# Patient Record
Sex: FEMALE | Race: WHITE | Employment: UNEMPLOYED | URBAN - METROPOLITAN AREA
[De-identification: names, ages, dates, MRNs, and addresses within clinical notes are randomized per-mention and may not be internally consistent; named-entity substitution may affect disease eponyms.]

---

## 2023-02-23 ENCOUNTER — APPOINTMENT (OUTPATIENT)
Dept: GENERAL RADIOLOGY | Age: 38
End: 2023-02-23
Payer: COMMERCIAL

## 2023-02-23 ENCOUNTER — HOSPITAL ENCOUNTER (EMERGENCY)
Age: 38
Discharge: HOME | End: 2023-02-23
Attending: EMERGENCY MEDICINE
Payer: COMMERCIAL

## 2023-02-23 VITALS
TEMPERATURE: 98.3 F | HEIGHT: 66 IN | OXYGEN SATURATION: 100 % | SYSTOLIC BLOOD PRESSURE: 105 MMHG | HEART RATE: 61 BPM | RESPIRATION RATE: 13 BRPM | DIASTOLIC BLOOD PRESSURE: 67 MMHG

## 2023-02-23 DIAGNOSIS — E86.0 DEHYDRATION: Primary | ICD-10-CM

## 2023-02-23 LAB
ALBUMIN SERPL-MCNC: 4.3 G/DL (ref 3.5–5)
ALBUMIN/GLOB SERPL: 1.2 (ref 0.4–1.6)
ALP SERPL-CCNC: 69 U/L (ref 50–130)
ALT SERPL-CCNC: 24 U/L (ref 12–65)
ANION GAP SERPL CALC-SCNC: 4 MMOL/L (ref 2–11)
APPEARANCE UR: CLEAR
AST SERPL-CCNC: 11 U/L (ref 15–37)
BASOPHILS # BLD: 0 K/UL (ref 0–0.2)
BASOPHILS NFR BLD: 1 % (ref 0–2)
BILIRUB SERPL-MCNC: 0.4 MG/DL (ref 0.2–1.1)
BILIRUB UR QL: NEGATIVE
BUN SERPL-MCNC: 6 MG/DL (ref 6–23)
CALCIUM SERPL-MCNC: 9.4 MG/DL (ref 8.3–10.4)
CHLORIDE SERPL-SCNC: 105 MMOL/L (ref 101–110)
CO2 SERPL-SCNC: 30 MMOL/L (ref 21–32)
COLOR UR: ABNORMAL
CREAT SERPL-MCNC: 0.62 MG/DL (ref 0.6–1)
DIFFERENTIAL METHOD BLD: ABNORMAL
EOSINOPHIL # BLD: 0.1 K/UL (ref 0–0.8)
EOSINOPHIL NFR BLD: 2 % (ref 0.5–7.8)
ERYTHROCYTE [DISTWIDTH] IN BLOOD BY AUTOMATED COUNT: 13.3 % (ref 11.9–14.6)
GLOBULIN SER CALC-MCNC: 3.6 G/DL (ref 2.8–4.5)
GLUCOSE SERPL-MCNC: 99 MG/DL (ref 65–100)
GLUCOSE UR STRIP.AUTO-MCNC: NEGATIVE MG/DL
HCG UR QL: NEGATIVE
HCG, URINE, POC: NEGATIVE
HCT VFR BLD AUTO: 42.6 % (ref 35.8–46.3)
HGB BLD-MCNC: 13.4 G/DL (ref 11.7–15.4)
HGB UR QL STRIP: NEGATIVE
IMM GRANULOCYTES # BLD AUTO: 0 K/UL (ref 0–0.5)
IMM GRANULOCYTES NFR BLD AUTO: 0 % (ref 0–5)
KETONES UR QL STRIP.AUTO: ABNORMAL MG/DL
LEUKOCYTE ESTERASE UR QL STRIP.AUTO: NEGATIVE
LYMPHOCYTES # BLD: 1.2 K/UL (ref 0.5–4.6)
LYMPHOCYTES NFR BLD: 33 % (ref 13–44)
Lab: NORMAL
MCH RBC QN AUTO: 27.8 PG (ref 26.1–32.9)
MCHC RBC AUTO-ENTMCNC: 31.5 G/DL (ref 31.4–35)
MCV RBC AUTO: 88.4 FL (ref 82–102)
MONOCYTES # BLD: 0.5 K/UL (ref 0.1–1.3)
MONOCYTES NFR BLD: 14 % (ref 4–12)
NEGATIVE QC PASS/FAIL: NORMAL
NEUTS SEG # BLD: 1.8 K/UL (ref 1.7–8.2)
NEUTS SEG NFR BLD: 51 % (ref 43–78)
NITRITE UR QL STRIP.AUTO: NEGATIVE
NRBC # BLD: 0 K/UL (ref 0–0.2)
PH UR STRIP: 7.5 (ref 5–9)
PLATELET # BLD AUTO: 304 K/UL (ref 150–450)
PMV BLD AUTO: 10.3 FL (ref 9.4–12.3)
POSITIVE QC PASS/FAIL: NORMAL
POTASSIUM SERPL-SCNC: 3.5 MMOL/L (ref 3.5–5.1)
PROT SERPL-MCNC: 7.9 G/DL (ref 6.3–8.2)
PROT UR STRIP-MCNC: NEGATIVE MG/DL
RBC # BLD AUTO: 4.82 M/UL (ref 4.05–5.2)
SODIUM SERPL-SCNC: 139 MMOL/L (ref 133–143)
SP GR UR REFRACTOMETRY: 1.01 (ref 1–1.02)
UROBILINOGEN UR QL STRIP.AUTO: 0.2 EU/DL (ref 0.2–1)
WBC # BLD AUTO: 3.6 K/UL (ref 4.3–11.1)

## 2023-02-23 PROCEDURE — 99285 EMERGENCY DEPT VISIT HI MDM: CPT

## 2023-02-23 PROCEDURE — 81003 URINALYSIS AUTO W/O SCOPE: CPT

## 2023-02-23 PROCEDURE — 71045 X-RAY EXAM CHEST 1 VIEW: CPT

## 2023-02-23 PROCEDURE — 2580000003 HC RX 258: Performed by: EMERGENCY MEDICINE

## 2023-02-23 PROCEDURE — 80053 COMPREHEN METABOLIC PANEL: CPT

## 2023-02-23 PROCEDURE — 96360 HYDRATION IV INFUSION INIT: CPT

## 2023-02-23 PROCEDURE — 85025 COMPLETE CBC W/AUTO DIFF WBC: CPT

## 2023-02-23 PROCEDURE — 93005 ELECTROCARDIOGRAM TRACING: CPT | Performed by: EMERGENCY MEDICINE

## 2023-02-23 PROCEDURE — 87086 URINE CULTURE/COLONY COUNT: CPT

## 2023-02-23 PROCEDURE — 81025 URINE PREGNANCY TEST: CPT

## 2023-02-23 RX ORDER — DESVENLAFAXINE 100 MG/1
100 TABLET, EXTENDED RELEASE ORAL DAILY
COMMUNITY
Start: 2023-02-13 | End: 2023-04-14

## 2023-02-23 RX ORDER — 0.9 % SODIUM CHLORIDE 0.9 %
1000 INTRAVENOUS SOLUTION INTRAVENOUS ONCE
Status: COMPLETED | OUTPATIENT
Start: 2023-02-23 | End: 2023-02-23

## 2023-02-23 RX ORDER — CETIRIZINE HYDROCHLORIDE 10 MG/1
10 TABLET ORAL DAILY
COMMUNITY
Start: 2022-02-02

## 2023-02-23 RX ORDER — CABERGOLINE 0.5 MG/1
0.25 TABLET ORAL
COMMUNITY
Start: 2022-09-15

## 2023-02-23 RX ORDER — METOCLOPRAMIDE 5 MG/1
5 TABLET ORAL 3 TIMES DAILY PRN
COMMUNITY
Start: 2023-02-13 | End: 2023-04-14

## 2023-02-23 RX ORDER — METOPROLOL SUCCINATE 50 MG/1
50 TABLET, EXTENDED RELEASE ORAL
COMMUNITY

## 2023-02-23 RX ADMIN — SODIUM CHLORIDE 1000 ML: 9 INJECTION, SOLUTION INTRAVENOUS at 21:16

## 2023-02-23 ASSESSMENT — ENCOUNTER SYMPTOMS
DIARRHEA: 0
EYES NEGATIVE: 1
ALLERGIC/IMMUNOLOGIC NEGATIVE: 1
VOMITING: 0
GASTROINTESTINAL NEGATIVE: 1
SHORTNESS OF BREATH: 0
VISUAL CHANGE: 0
RESPIRATORY NEGATIVE: 1
NAUSEA: 0
BLOOD IN STOOL: 0

## 2023-02-23 ASSESSMENT — PAIN SCALES - GENERAL: PAINLEVEL_OUTOF10: 1

## 2023-02-23 ASSESSMENT — PAIN - FUNCTIONAL ASSESSMENT: PAIN_FUNCTIONAL_ASSESSMENT: 0-10

## 2023-02-23 ASSESSMENT — PAIN DESCRIPTION - LOCATION: LOCATION: CHEST

## 2023-02-23 ASSESSMENT — LIFESTYLE VARIABLES
HOW MANY STANDARD DRINKS CONTAINING ALCOHOL DO YOU HAVE ON A TYPICAL DAY: PATIENT DOES NOT DRINK
HOW OFTEN DO YOU HAVE A DRINK CONTAINING ALCOHOL: NEVER

## 2023-02-23 ASSESSMENT — PAIN DESCRIPTION - ORIENTATION: ORIENTATION: MID

## 2023-02-24 LAB
EKG ATRIAL RATE: 66 BPM
EKG DIAGNOSIS: NORMAL
EKG P AXIS: 37 DEGREES
EKG P-R INTERVAL: 132 MS
EKG Q-T INTERVAL: 420 MS
EKG QRS DURATION: 88 MS
EKG QTC CALCULATION (BAZETT): 440 MS
EKG R AXIS: 92 DEGREES
EKG T AXIS: 66 DEGREES
EKG VENTRICULAR RATE: 66 BPM

## 2023-02-24 NOTE — ED PROVIDER NOTES
Emergency Department Provider Note                   PCP:                Pcp No               Age: 40 y.o. Sex: female       ICD-10-CM    1. Dehydration  E86.0           DISPOSITION Decision To Discharge 02/23/2023 10:20:03 PM       Medical Decision Making  Patient feeling much better after the IV fluids here and states \"this is my normal POTS dehydration. \"  She does not desire any further work-up. Patient presented with acute dehydration to the ED. History obtained from patient. I reviewed the following tests labs, EKG, XR. I did review records from an external source. I did engage in shared decision making with the patient/family. I considered further evaluation but did not do it due to patient feeling much better after IVF and \"this is a normal flare of my POTS. \" There is no social determinant of health affecting care. I did not use a clinical guideline to determine care of the patient. I discussed results/disposition with patient. Problems Addressed:  Dehydration: acute illness or injury    Amount and/or Complexity of Data Reviewed  Labs: ordered. Radiology: ordered. ECG/medicine tests: ordered. Risk  Prescription drug management.                                 Orders Placed This Encounter   Procedures    Culture, Urine    XR CHEST PORTABLE    CBC with Auto Differential    Comprehensive Metabolic Panel    Urinalysis w rflx microscopic    Cardiac Monitor - ED Only    Continuous Pulse Oximetry    Orthostatic blood pressure and pulse    Orthostatic blood pressure and pulse    POCT Urine Dipstick    POC Pregnancy Urine Qual    POC Pregnancy Urine Qual    EKG 12 Lead        Medications   0.9 % sodium chloride bolus (0 mLs IntraVENous Stopped 2/23/23 2218)       Discharge Medication List as of 2/23/2023 10:28 PM           Joanna Sutherland is a 40 y.o. female who presents to the Emergency Department with chief complaint of    Chief Complaint   Patient presents with    Dizziness      Patient is here with lightheadedness and dizziness with postural changes that started this morning when she woke up. She has a history of POTS syndrome and states \"this is a normal flare for me. \"  She did go to work all day and her boss told her to come in and get IV fluids. She is currently receiving them now and feeling much better. No new chest pain, shortness of breath, upper abdominal pain, dizziness, weakness, dyspnea exertion, orthopnea, swelling/tingling or weakness to their arms or legs, trouble with urination or bowel movements or other new symptoms. They did ambulate to the room without difficulty. No trouble with speaking. The history is provided by the patient. Dizziness  Quality:  Lightheadedness  Severity:  Mild  Onset quality:  Gradual  Duration:  1 day  Timing:  Constant  Progression:  Unchanged  Chronicity:  Chronic  Context: bending over    Context: not with bowel movement, not with ear pain, not with eye movement, not with head movement, not with inactivity, not with loss of consciousness, not with medication, not with physical activity, not when standing up and not when urinating    Relieved by:  Nothing  Worsened by:  Nothing  Ineffective treatments:  None tried  Associated symptoms: no blood in stool, no chest pain, no diarrhea, no headaches, no hearing loss, no nausea, no palpitations, no shortness of breath, no syncope, no tinnitus, no vision changes, no vomiting and no weakness    Risk factors: no anemia, no heart disease, no hx of stroke, no hx of vertigo, no Meniere's disease, no multiple medications and no new medications       Review of Systems   Constitutional: Negative. Negative for fever. HENT: Negative. Negative for hearing loss and tinnitus. Eyes: Negative. Respiratory: Negative. Negative for shortness of breath. Cardiovascular: Negative. Negative for chest pain, palpitations and syncope. Gastrointestinal: Negative.   Negative for blood in stool, diarrhea, nausea and vomiting. Endocrine: Negative. Genitourinary: Negative. Musculoskeletal: Negative. Skin: Negative. Allergic/Immunologic: Negative. Neurological:  Positive for dizziness. Negative for weakness and headaches. Hematological: Negative. Psychiatric/Behavioral: Negative. All other systems reviewed and are negative. Past Medical History:   Diagnosis Date    Eating disorder     POTS (postural orthostatic tachycardia syndrome)         History reviewed. No pertinent surgical history. History reviewed. No pertinent family history. Social History     Socioeconomic History    Marital status: Single     Spouse name: None    Number of children: None    Years of education: None    Highest education level: None   Tobacco Use    Smoking status: Never   Substance and Sexual Activity    Alcohol use: Never        Allergies: Albuterol    Discharge Medication List as of 2/23/2023 10:28 PM        CONTINUE these medications which have NOT CHANGED    Details   desvenlafaxine succinate (PRISTIQ) 100 MG TB24 extended release tablet Take 100 mg by mouth dailyHistorical Med      cabergoline (DOSTINEX) 0.5 MG tablet Take 0.25 mg by mouth Twice a WeekHistorical Med      metoclopramide (REGLAN) 5 MG tablet Take 5 mg by mouth 3 times daily as neededHistorical Med      cetirizine (ZYRTEC) 10 MG tablet Take 10 mg by mouth dailyHistorical Med      metoprolol succinate (TOPROL XL) 50 MG extended release tablet Take 50 mg by mouthHistorical Med              Vitals signs and nursing note reviewed. Patient Vitals for the past 4 hrs:   Pulse Resp BP SpO2   02/23/23 2200 61 13 105/67 100 %   02/23/23 2130 67 18 109/70 100 %   02/23/23 2112 81 16 124/79 100 %   02/23/23 2111 79 16 122/65 99 %   02/23/23 2110 86 20 124/79 100 %   02/23/23 2109 79 12 123/66 100 %   02/23/23 2108 80 21 122/65 99 %          Physical Exam  Vitals and nursing note reviewed. Constitutional:       Appearance: Normal appearance.    HENT: Head: Normocephalic and atraumatic. Right Ear: External ear normal.      Left Ear: External ear normal.      Nose: Nose normal.      Mouth/Throat:      Mouth: Mucous membranes are dry. Pharynx: Oropharynx is clear. Eyes:      Extraocular Movements: Extraocular movements intact. Conjunctiva/sclera: Conjunctivae normal.      Pupils: Pupils are equal, round, and reactive to light. Cardiovascular:      Rate and Rhythm: Normal rate and regular rhythm. Pulses: Normal pulses. Heart sounds: Normal heart sounds. Pulmonary:      Effort: Pulmonary effort is normal.      Breath sounds: Normal breath sounds. Abdominal:      General: Abdomen is flat. Palpations: Abdomen is soft. Musculoskeletal:         General: Normal range of motion. Cervical back: Normal range of motion and neck supple. Skin:     General: Skin is warm. Capillary Refill: Capillary refill takes less than 2 seconds. Neurological:      General: No focal deficit present. Mental Status: She is alert and oriented to person, place, and time. Mental status is at baseline. GCS: GCS eye subscore is 4. GCS verbal subscore is 5. GCS motor subscore is 6. Cranial Nerves: Cranial nerves 2-12 are intact. Sensory: Sensation is intact. Motor: Motor function is intact. Coordination: Coordination is intact. Gait: Gait is intact. Psychiatric:         Mood and Affect: Mood normal.         Behavior: Behavior normal.         Thought Content: Thought content normal.         Judgment: Judgment normal.        Procedures    ED EKG Interpretation  EKG was interpreted in the absence of a cardiologist.    Rate: 66 bpm.  EKG Interpretation: EKG Interpretation: sinus rhythm  ST Segments: Normal ST segments - NO STEMI    Is this patient to be included in the SEP-1 core measure due to severe sepsis or septic shock?  No Exclusion criteria - the patient is NOT to be included for SEP-1 Core Measure due to: Infection is not suspected     Results for orders placed or performed during the hospital encounter of 02/23/23   XR CHEST PORTABLE    Narrative    EXAMINATION: Single view chest    DATE: 2/23/2023 8:03 PM    COMPARISON: None    CLINICAL HISTORY: Syncope    FINDINGS:    Costophrenic angles are clear. Heart size is normal.  Pulmonary vasculature is not distended. There are no dense regions of   consolidation. Impression    No acute cardiopulmonary process. Walter Holland M.D.   2/23/2023 8:27:00 PM   CBC with Auto Differential   Result Value Ref Range    WBC 3.6 (L) 4.3 - 11.1 K/uL    RBC 4.82 4.05 - 5.2 M/uL    Hemoglobin 13.4 11.7 - 15.4 g/dL    Hematocrit 42.6 35.8 - 46.3 %    MCV 88.4 82.0 - 102.0 FL    MCH 27.8 26.1 - 32.9 PG    MCHC 31.5 31.4 - 35.0 g/dL    RDW 13.3 11.9 - 14.6 %    Platelets 672 464 - 307 K/uL    MPV 10.3 9.4 - 12.3 FL    nRBC 0.00 0.0 - 0.2 K/uL    Differential Type AUTOMATED      Seg Neutrophils 51 43 - 78 %    Lymphocytes 33 13 - 44 %    Monocytes 14 (H) 4.0 - 12.0 %    Eosinophils % 2 0.5 - 7.8 %    Basophils 1 0.0 - 2.0 %    Immature Granulocytes 0 0.0 - 5.0 %    Segs Absolute 1.8 1.7 - 8.2 K/UL    Absolute Lymph # 1.2 0.5 - 4.6 K/UL    Absolute Mono # 0.5 0.1 - 1.3 K/UL    Absolute Eos # 0.1 0.0 - 0.8 K/UL    Basophils Absolute 0.0 0.0 - 0.2 K/UL    Absolute Immature Granulocyte 0.0 0.0 - 0.5 K/UL   Comprehensive Metabolic Panel   Result Value Ref Range    Sodium 139 133 - 143 mmol/L    Potassium 3.5 3.5 - 5.1 mmol/L    Chloride 105 101 - 110 mmol/L    CO2 30 21 - 32 mmol/L    Anion Gap 4 2 - 11 mmol/L    Glucose 99 65 - 100 mg/dL    BUN 6 6 - 23 MG/DL    Creatinine 0.62 0.6 - 1.0 MG/DL    Est, Glom Filt Rate >60 >60 ml/min/1.73m2    Calcium 9.4 8.3 - 10.4 MG/DL    Total Bilirubin 0.4 0.2 - 1.1 MG/DL    ALT 24 12 - 65 U/L    AST 11 (L) 15 - 37 U/L    Alk Phosphatase 69 50 - 130 U/L    Total Protein 7.9 6.3 - 8.2 g/dL    Albumin 4.3 3.5 - 5.0 g/dL    Globulin 3.6 2.8 - 4.5 g/dL    Albumin/Globulin Ratio 1.2 0.4 - 1.6     Urinalysis w rflx microscopic   Result Value Ref Range    Color, UA YELLOW/STRAW      Appearance CLEAR      Specific Gravity, UA 1.007 1.001 - 1.023      pH, Urine 7.5 5.0 - 9.0      Protein, UA Negative NEG mg/dL    Glucose, UA Negative mg/dL    Ketones, Urine TRACE (A) NEG mg/dL    Bilirubin Urine Negative NEG      Blood, Urine Negative NEG      Urobilinogen, Urine 0.2 0.2 - 1.0 EU/dL    Nitrite, Urine Negative NEG      Leukocyte Esterase, Urine Negative NEG     POC Pregnancy Urine Qual   Result Value Ref Range    HCG, Urine, POC Negative Negative    Lot Number [id number]     Positive QC Pass/Fail Pass     Negative QC Pass/Fail Pass    POC Pregnancy Urine Qual   Result Value Ref Range    Preg Test, Ur Negative NEG     EKG 12 Lead   Result Value Ref Range    Ventricular Rate 66 BPM    Atrial Rate 66 BPM    P-R Interval 132 ms    QRS Duration 88 ms    Q-T Interval 420 ms    QTc Calculation (Bazett) 440 ms    P Axis 37 degrees    R Axis 92 degrees    T Axis 66 degrees    Diagnosis Normal sinus rhythm         XR CHEST PORTABLE   Final Result      No acute cardiopulmonary process. Walter Lester M.D.    2/23/2023 8:27:00 PM                            Voice dictation software was used during the making of this note. This software is not perfect and grammatical and other typographical errors may be present. This note has not been completely proofread for errors.      ALEXANDRA Romero  02/24/23 [id number]

## 2023-02-24 NOTE — DISCHARGE INSTRUCTIONS
Drink plenty of fluids, rest, follow-up with your primary care physician for recheck and return to the ED if worsening in any way.

## 2023-02-25 LAB
BACTERIA SPEC CULT: ABNORMAL
SERVICE CMNT-IMP: ABNORMAL

## 2023-03-24 ENCOUNTER — HOSPITAL ENCOUNTER (EMERGENCY)
Age: 38
Discharge: HOME | End: 2023-03-24
Attending: EMERGENCY MEDICINE
Payer: COMMERCIAL

## 2023-03-24 VITALS
OXYGEN SATURATION: 99 % | TEMPERATURE: 98.3 F | DIASTOLIC BLOOD PRESSURE: 66 MMHG | HEIGHT: 66 IN | SYSTOLIC BLOOD PRESSURE: 106 MMHG | HEART RATE: 62 BPM | RESPIRATION RATE: 14 BRPM

## 2023-03-24 DIAGNOSIS — R42 LIGHTHEADEDNESS: Primary | ICD-10-CM

## 2023-03-24 LAB
ALBUMIN SERPL-MCNC: 3.9 G/DL (ref 3.5–5)
ALBUMIN/GLOB SERPL: 1.1 (ref 0.4–1.6)
ALP SERPL-CCNC: 55 U/L (ref 50–136)
ALT SERPL-CCNC: 39 U/L (ref 12–65)
ANION GAP SERPL CALC-SCNC: 3 MMOL/L (ref 2–11)
AST SERPL-CCNC: 14 U/L (ref 15–37)
BASOPHILS # BLD: 0 K/UL (ref 0–0.2)
BASOPHILS NFR BLD: 1 % (ref 0–2)
BILIRUB SERPL-MCNC: 0.2 MG/DL (ref 0.2–1.1)
BUN SERPL-MCNC: 8 MG/DL (ref 6–23)
CALCIUM SERPL-MCNC: 9.9 MG/DL (ref 8.3–10.4)
CHLORIDE SERPL-SCNC: 105 MMOL/L (ref 101–110)
CO2 SERPL-SCNC: 31 MMOL/L (ref 21–32)
CREAT SERPL-MCNC: 0.62 MG/DL (ref 0.6–1)
DIFFERENTIAL METHOD BLD: ABNORMAL
EOSINOPHIL # BLD: 0.1 K/UL (ref 0–0.8)
EOSINOPHIL NFR BLD: 4 % (ref 0.5–7.8)
ERYTHROCYTE [DISTWIDTH] IN BLOOD BY AUTOMATED COUNT: 13.8 % (ref 11.9–14.6)
GLOBULIN SER CALC-MCNC: 3.5 G/DL (ref 2.8–4.5)
GLUCOSE SERPL-MCNC: 111 MG/DL (ref 65–100)
HCT VFR BLD AUTO: 41.1 % (ref 35.8–46.3)
HGB BLD-MCNC: 12.9 G/DL (ref 11.7–15.4)
IMM GRANULOCYTES # BLD AUTO: 0 K/UL (ref 0–0.5)
IMM GRANULOCYTES NFR BLD AUTO: 1 % (ref 0–5)
LYMPHOCYTES # BLD: 1.5 K/UL (ref 0.5–4.6)
LYMPHOCYTES NFR BLD: 37 % (ref 13–44)
MCH RBC QN AUTO: 27.6 PG (ref 26.1–32.9)
MCHC RBC AUTO-ENTMCNC: 31.4 G/DL (ref 31.4–35)
MCV RBC AUTO: 88 FL (ref 82–102)
MONOCYTES # BLD: 0.5 K/UL (ref 0.1–1.3)
MONOCYTES NFR BLD: 11 % (ref 4–12)
NEUTS SEG # BLD: 1.9 K/UL (ref 1.7–8.2)
NEUTS SEG NFR BLD: 47 % (ref 43–78)
NRBC # BLD: 0 K/UL (ref 0–0.2)
PLATELET # BLD AUTO: 280 K/UL (ref 150–450)
PMV BLD AUTO: 11 FL (ref 9.4–12.3)
POTASSIUM SERPL-SCNC: 3.8 MMOL/L (ref 3.5–5.1)
PROT SERPL-MCNC: 7.4 G/DL (ref 6.3–8.2)
RBC # BLD AUTO: 4.67 M/UL (ref 4.05–5.2)
SODIUM SERPL-SCNC: 139 MMOL/L (ref 133–143)
TSH W FREE THYROID IF ABNORMAL: 1.75 UIU/ML (ref 0.36–3.74)
WBC # BLD AUTO: 4 K/UL (ref 4.3–11.1)

## 2023-03-24 PROCEDURE — 93005 ELECTROCARDIOGRAM TRACING: CPT

## 2023-03-24 PROCEDURE — 85025 COMPLETE CBC W/AUTO DIFF WBC: CPT

## 2023-03-24 PROCEDURE — 99284 EMERGENCY DEPT VISIT MOD MDM: CPT

## 2023-03-24 PROCEDURE — 80053 COMPREHEN METABOLIC PANEL: CPT

## 2023-03-24 PROCEDURE — 2580000003 HC RX 258

## 2023-03-24 PROCEDURE — 84443 ASSAY THYROID STIM HORMONE: CPT

## 2023-03-24 RX ORDER — SODIUM CHLORIDE, SODIUM LACTATE, POTASSIUM CHLORIDE, AND CALCIUM CHLORIDE .6; .31; .03; .02 G/100ML; G/100ML; G/100ML; G/100ML
1000 INJECTION, SOLUTION INTRAVENOUS ONCE
Status: COMPLETED | OUTPATIENT
Start: 2023-03-24 | End: 2023-03-24

## 2023-03-24 RX ADMIN — SODIUM CHLORIDE, POTASSIUM CHLORIDE, SODIUM LACTATE AND CALCIUM CHLORIDE 1000 ML: 600; 310; 30; 20 INJECTION, SOLUTION INTRAVENOUS at 21:43

## 2023-03-24 ASSESSMENT — ENCOUNTER SYMPTOMS
DIARRHEA: 0
SHORTNESS OF BREATH: 0
VOMITING: 0
CHEST TIGHTNESS: 0
COLOR CHANGE: 0
ABDOMINAL PAIN: 0
NAUSEA: 0

## 2023-03-25 LAB
EKG ATRIAL RATE: 74 BPM
EKG DIAGNOSIS: NORMAL
EKG P AXIS: 60 DEGREES
EKG P-R INTERVAL: 146 MS
EKG Q-T INTERVAL: 392 MS
EKG QRS DURATION: 92 MS
EKG QTC CALCULATION (BAZETT): 432 MS
EKG R AXIS: 94 DEGREES
EKG T AXIS: 67 DEGREES
EKG VENTRICULAR RATE: 73 BPM

## 2023-03-25 NOTE — ED NOTES
I have reviewed discharge instructions with the patient. The patient verbalized understanding. Patient left ED via Discharge Method: ambulatory to Home with self. Opportunity for questions and clarification provided. Patient given 0 scripts. To continue your aftercare when you leave the hospital, you may receive an automated call from our care team to check in on how you are doing. This is a free service and part of our promise to provide the best care and service to meet your aftercare needs.  If you have questions, or wish to unsubscribe from this service please call [phone]. Thank you for Choosing our [facility] Emergency Department.         Ananth Valencia RN  03/24/23 4445

## 2023-03-25 NOTE — ED PROVIDER NOTES
Exclusion criteria - the patient is NOT to be included for SEP-1 Core Measure due to: Infection is not suspected     Chano Contreras is a 40 y.o. female who presents to the Emergency Department with chief complaint of    Chief Complaint   Patient presents with    Dizziness    Fatigue      Patient is a 70-year-old female with history of postural orthostatic hypotension presenting to the emergency department for evaluation of lightheadedness and dizziness for the past several days. States that she had a bout of nausea, vomiting, and diarrhea approximately 1 week prior and has felt some residual symptoms of lightheadedness especially when changing positions. States she has been trying to rehydrate at home but has not quite gotten over her illness. States that she does have to periodically present for IV fluids in the setting of her POTS. She endorses occasional palpitations but denies other symptomatology. She denies any additional aggravating relieving factors or radiation of her symptoms. She has no other complaints today. The history is provided by the patient. Review of Systems   Constitutional:  Negative for chills, fatigue and fever. Eyes:  Negative for visual disturbance. Respiratory:  Negative for chest tightness and shortness of breath. Cardiovascular:  Negative for chest pain and palpitations. Gastrointestinal:  Negative for abdominal pain, diarrhea, nausea and vomiting. Genitourinary:  Negative for dysuria. Musculoskeletal:  Negative for arthralgias and myalgias. Skin:  Negative for color change and wound. Neurological:  Positive for dizziness and light-headedness. Negative for syncope, weakness and headaches. All other systems reviewed and are negative. Vitals signs and nursing note reviewed:  No data found. Physical Exam  Vitals and nursing note reviewed. Constitutional:       General: She is not in acute distress. Appearance: Normal appearance.  She is not

## 2023-03-25 NOTE — ED TRIAGE NOTES
Dizziness x 1wk since stomach flu last week; lightheaded and palpitations with standing, hx of POTS and anorexia, episodes of purging 2wks ago

## 2023-03-25 NOTE — DISCHARGE INSTRUCTIONS
Lab work very reassuring. EKG looks good. You did receive some fluids in the emergency department which appeared to improve your symptoms. Please return any worsening symptoms or concerns.   Otherwise, plan to follow-up with your primary care doctor and cardiologist.

## 2023-04-10 DIAGNOSIS — G90.A POTS (POSTURAL ORTHOSTATIC TACHYCARDIA SYNDROME): ICD-10-CM

## 2023-04-10 LAB — HGB BLD-MCNC: 13.6 G/DL (ref 11.7–15.4)

## 2023-04-11 LAB
FERRITIN SERPL-MCNC: 15 NG/ML (ref 8–388)
IRON SATN MFR SERPL: 31 %
IRON SERPL-MCNC: 114 UG/DL (ref 35–150)
TIBC SERPL-MCNC: 369 UG/DL (ref 250–450)

## 2023-04-13 ENCOUNTER — TELEPHONE (OUTPATIENT)
Dept: CARDIOLOGY CLINIC | Age: 38
End: 2023-04-13

## 2023-05-23 ENCOUNTER — HOSPITAL ENCOUNTER (EMERGENCY)
Age: 38
Discharge: HOME | End: 2023-05-23
Attending: EMERGENCY MEDICINE
Payer: COMMERCIAL

## 2023-05-23 ENCOUNTER — OFFICE VISIT (OUTPATIENT)
Age: 38
End: 2023-05-23
Payer: COMMERCIAL

## 2023-05-23 VITALS
WEIGHT: 150 LBS | RESPIRATION RATE: 17 BRPM | BODY MASS INDEX: 24.11 KG/M2 | HEIGHT: 66 IN | TEMPERATURE: 97.7 F | DIASTOLIC BLOOD PRESSURE: 78 MMHG | SYSTOLIC BLOOD PRESSURE: 94 MMHG | HEART RATE: 68 BPM | OXYGEN SATURATION: 99 %

## 2023-05-23 VITALS
HEART RATE: 80 BPM | HEIGHT: 66 IN | SYSTOLIC BLOOD PRESSURE: 132 MMHG | BODY MASS INDEX: 24.25 KG/M2 | DIASTOLIC BLOOD PRESSURE: 64 MMHG | WEIGHT: 150.9 LBS

## 2023-05-23 DIAGNOSIS — R42 LIGHTHEADEDNESS: Primary | ICD-10-CM

## 2023-05-23 DIAGNOSIS — G90.A POTS (POSTURAL ORTHOSTATIC TACHYCARDIA SYNDROME): Primary | ICD-10-CM

## 2023-05-23 LAB
ALBUMIN SERPL-MCNC: 4.1 G/DL (ref 3.5–5)
ALBUMIN/GLOB SERPL: 1.1 (ref 0.4–1.6)
ALP SERPL-CCNC: 55 U/L (ref 50–136)
ALT SERPL-CCNC: 23 U/L (ref 12–65)
ANION GAP SERPL CALC-SCNC: 3 MMOL/L (ref 2–11)
AST SERPL-CCNC: 13 U/L (ref 15–37)
BASOPHILS # BLD: 0 K/UL (ref 0–0.2)
BASOPHILS NFR BLD: 1 % (ref 0–2)
BILIRUB SERPL-MCNC: 0.3 MG/DL (ref 0.2–1.1)
BUN SERPL-MCNC: 9 MG/DL (ref 6–23)
CALCIUM SERPL-MCNC: 9.4 MG/DL (ref 8.3–10.4)
CHLORIDE SERPL-SCNC: 110 MMOL/L (ref 101–110)
CO2 SERPL-SCNC: 28 MMOL/L (ref 21–32)
CREAT SERPL-MCNC: 0.66 MG/DL (ref 0.6–1)
DIFFERENTIAL METHOD BLD: ABNORMAL
EOSINOPHIL # BLD: 0.1 K/UL (ref 0–0.8)
EOSINOPHIL NFR BLD: 2 % (ref 0.5–7.8)
ERYTHROCYTE [DISTWIDTH] IN BLOOD BY AUTOMATED COUNT: 13 % (ref 11.9–14.6)
GLOBULIN SER CALC-MCNC: 3.9 G/DL (ref 2.8–4.5)
GLUCOSE SERPL-MCNC: 111 MG/DL (ref 65–100)
HCT VFR BLD AUTO: 43.2 % (ref 35.8–46.3)
HGB BLD-MCNC: 13.9 G/DL (ref 11.7–15.4)
IMM GRANULOCYTES # BLD AUTO: 0 K/UL (ref 0–0.5)
IMM GRANULOCYTES NFR BLD AUTO: 0 % (ref 0–5)
LYMPHOCYTES # BLD: 1.1 K/UL (ref 0.5–4.6)
LYMPHOCYTES NFR BLD: 33 % (ref 13–44)
MAGNESIUM SERPL-MCNC: 2.5 MG/DL (ref 1.8–2.4)
MCH RBC QN AUTO: 28.7 PG (ref 26.1–32.9)
MCHC RBC AUTO-ENTMCNC: 32.2 G/DL (ref 31.4–35)
MCV RBC AUTO: 89.3 FL (ref 82–102)
MONOCYTES # BLD: 0.4 K/UL (ref 0.1–1.3)
MONOCYTES NFR BLD: 12 % (ref 4–12)
NEUTS SEG # BLD: 1.8 K/UL (ref 1.7–8.2)
NEUTS SEG NFR BLD: 52 % (ref 43–78)
NRBC # BLD: 0 K/UL (ref 0–0.2)
PLATELET # BLD AUTO: 267 K/UL (ref 150–450)
PMV BLD AUTO: 10.7 FL (ref 9.4–12.3)
POTASSIUM SERPL-SCNC: 3.6 MMOL/L (ref 3.5–5.1)
PROT SERPL-MCNC: 8 G/DL (ref 6.3–8.2)
RBC # BLD AUTO: 4.84 M/UL (ref 4.05–5.2)
SODIUM SERPL-SCNC: 141 MMOL/L (ref 133–143)
WBC # BLD AUTO: 3.5 K/UL (ref 4.3–11.1)

## 2023-05-23 PROCEDURE — 83735 ASSAY OF MAGNESIUM: CPT

## 2023-05-23 PROCEDURE — 99284 EMERGENCY DEPT VISIT MOD MDM: CPT

## 2023-05-23 PROCEDURE — 2580000003 HC RX 258: Performed by: PHYSICIAN ASSISTANT

## 2023-05-23 PROCEDURE — 85025 COMPLETE CBC W/AUTO DIFF WBC: CPT

## 2023-05-23 PROCEDURE — 80053 COMPREHEN METABOLIC PANEL: CPT

## 2023-05-23 PROCEDURE — 99214 OFFICE O/P EST MOD 30 MIN: CPT | Performed by: INTERNAL MEDICINE

## 2023-05-23 PROCEDURE — 93005 ELECTROCARDIOGRAM TRACING: CPT | Performed by: PHYSICIAN ASSISTANT

## 2023-05-23 PROCEDURE — 96361 HYDRATE IV INFUSION ADD-ON: CPT

## 2023-05-23 PROCEDURE — 96360 HYDRATION IV INFUSION INIT: CPT

## 2023-05-23 RX ORDER — 0.9 % SODIUM CHLORIDE 0.9 %
1000 INTRAVENOUS SOLUTION INTRAVENOUS ONCE
Status: COMPLETED | OUTPATIENT
Start: 2023-05-23 | End: 2023-05-23

## 2023-05-23 RX ORDER — NORGESTIMATE AND ETHINYL ESTRADIOL 0.25-0.035
1 KIT ORAL DAILY
COMMUNITY
Start: 2023-05-15

## 2023-05-23 RX ADMIN — SODIUM CHLORIDE 1000 ML: 9 INJECTION, SOLUTION INTRAVENOUS at 18:09

## 2023-05-23 ASSESSMENT — PAIN - FUNCTIONAL ASSESSMENT: PAIN_FUNCTIONAL_ASSESSMENT: 0-10

## 2023-05-23 ASSESSMENT — ENCOUNTER SYMPTOMS
STRIDOR: 0
NAIL CHANGES: 0
ABDOMINAL PAIN: 0
EYE PAIN: 0
APHONIA: 0
GASTROINTESTINAL NEGATIVE: 1
COUGH: 0
RESPIRATORY NEGATIVE: 1

## 2023-05-23 ASSESSMENT — PAIN SCALES - GENERAL: PAINLEVEL_OUTOF10: 0

## 2023-05-23 NOTE — ED PROVIDER NOTES
Emergency Department Provider Note       PCP: Mickey Hawthorne RN   Age: 40 y.o. Sex: female     DISPOSITION Decision To Discharge 05/23/2023 07:18:08 PM       ICD-10-CM    1. Lightheadedness  R42           Medical Decision Making     Complexity of Problems Addressed:  1 or more acute illnesses that pose a threat to life or bodily function. Data Reviewed and Analyzed:  Category 1:   I independently ordered and reviewed each unique test.  I reviewed external records: provider visit note from outside specialist.       Category 2:       Category 3: Discussion of management or test interpretation. Pt is a 44yo female with hx pots who comes in with orthostatic light headedness. Her pcp told her to come here for IVF, as she typically has these symptoms with volume depletion. She also has hx anorexia and has not been eating as well. Labs grossly unrevealing without significant abnormalities. Ekg non ischemic. Not orthostatic urine without infection. Not pregnant. Feels better after IVF. Standing without symptoms. Will dc home. Strict return precautions given. She is agreeable to plan. Risk of Complications and/or Morbidity of Patient Management:  Chronic medical problems impacting care include POTS, anorexia. ED Course as of 05/23/23 1928 Tue May 23, 2023   1915 EKG 12 Lead  EKG: NSR rate of 70 no acute ischemic changes. No stemi. Qtc 449 [ZAIN]      ED Course User Index  [ZAIN] ALEXANDRA Powell       Eze Camarena is a 40 y.o. female who presents to the Emergency Department with chief complaint of    Chief Complaint   Patient presents with    Dizziness      Patient is a 70-year-old female with history of POTS and anorexia. She states that she has been having some orthostatic dizziness which is typical when she is dehydrated. She saw her cardiologist today who reportedly agrees with her primary care doctor who reportedly told her to come here for IV fluids.   She says typically when she gets

## 2023-05-23 NOTE — PROGRESS NOTES
History:  Past Medical History:   Diagnosis Date    Abnormal Pap smear of cervix 2007   followed up with pap smears per pt report    Anxiety 2010    Asthma    Depression    Female infertility present    Pachyonychia congenita    SVT (supraventricular tachycardia) ([facility] [address]    Varicella 1991     Past Surgical History:   Procedure Laterality Date    DILATION AND CURETTAGE OF UTERUS   with polypectomy    HYSTEROSCOPY 2022    UPPER GASTROINTESTINAL ENDOSCOPY   age 21    WISDOM TOOTH EXTRACTION   age 12     Family History   Problem Relation Age of Onset    Alcohol abuse Maternal Uncle    Anxiety disorder Mother    Depression Mother    Asthma Mother    Stroke Mother    Depression Maternal Grandmother     Past medical/surgical history, medications, family and social history were reviewed and documented in medical record. Allergies   Allergen Reactions    Apple Headache-Intolerance    Augmentin [Amoxicillin-Pot Clavulanate] Nausea and/or vomiting-Intolerance    Meperidine Rash     Social History     Tobacco Use    Smoking status: Never    Smokeless tobacco: Never   Substance Use Topics    Alcohol use: Never       ROS:    Review of Systems   Constitutional: Negative for fever. HENT:  Negative for stridor. Eyes:  Negative for pain. Cardiovascular:  Negative for chest pain. Respiratory:  Negative for cough. Endocrine: Negative for cold intolerance. Skin:  Negative for nail changes. Musculoskeletal:  Negative for arthritis. Gastrointestinal:  Negative for abdominal pain. Genitourinary:  Negative for dysuria. Neurological:  Negative for aphonia. Psychiatric/Behavioral:  Negative for altered mental status. Allergic/Immunologic: Negative for hives. PHYSICAL EXAM:   /64   Pulse 80   Ht 5' 6\" (1.676 m)   Wt 150 lb 14.4 oz (68.4 kg)   BMI 24.36 kg/m²      Physical Exam  Vitals reviewed. HENT:      Head: Normocephalic.       Right Ear: External ear normal.      Left Ear: External ear normal.

## 2023-05-23 NOTE — ED TRIAGE NOTES
Dizziness that started on Thursday on Thursday and has progressively gotten worse. Reports associated nausea. Saw PCP today who recommended her to get fluids. Denies numbness/tingling/vision changes. Denies injury/trauma .  Hx POTS

## 2023-05-24 LAB
EKG ATRIAL RATE: 70 BPM
EKG DIAGNOSIS: NORMAL
EKG P AXIS: 35 DEGREES
EKG P-R INTERVAL: 138 MS
EKG Q-T INTERVAL: 416 MS
EKG QRS DURATION: 80 MS
EKG QTC CALCULATION (BAZETT): 449 MS
EKG R AXIS: 95 DEGREES
EKG T AXIS: 76 DEGREES
EKG VENTRICULAR RATE: 70 BPM

## 2023-05-24 PROCEDURE — 93010 ELECTROCARDIOGRAM REPORT: CPT | Performed by: INTERNAL MEDICINE

## 2023-05-24 NOTE — ED NOTES
I have reviewed discharge instructions with the patient. The patient verbalized understanding. Patient left ED via Discharge Method: ambulatory to Home with self. Opportunity for questions and clarification provided. Patient given 0 scripts. To continue your aftercare when you leave the hospital, you may receive an automated call from our care team to check in on how you are doing. This is a free service and part of our promise to provide the best care and service to meet your aftercare needs.  If you have questions, or wish to unsubscribe from this service please call [phone]. Thank you for Choosing our [facility] Emergency Department.         Carmine Dick RN  05/23/23 0555

## 2023-06-09 ENCOUNTER — APPOINTMENT (OUTPATIENT)
Dept: GENERAL RADIOLOGY | Age: 38
End: 2023-06-09
Payer: COMMERCIAL

## 2023-06-09 ENCOUNTER — HOSPITAL ENCOUNTER (EMERGENCY)
Age: 38
Discharge: HOME | End: 2023-06-09
Attending: EMERGENCY MEDICINE
Payer: COMMERCIAL

## 2023-06-09 VITALS
RESPIRATION RATE: 18 BRPM | SYSTOLIC BLOOD PRESSURE: 120 MMHG | BODY MASS INDEX: 24.21 KG/M2 | HEIGHT: 66 IN | TEMPERATURE: 97.7 F | HEART RATE: 68 BPM | OXYGEN SATURATION: 98 % | DIASTOLIC BLOOD PRESSURE: 65 MMHG

## 2023-06-09 DIAGNOSIS — I95.1 ORTHOSTATIC SYNCOPE: Primary | ICD-10-CM

## 2023-06-09 LAB
ALBUMIN SERPL-MCNC: 4 G/DL (ref 3.5–5)
ALBUMIN/GLOB SERPL: 1 (ref 0.4–1.6)
ALP SERPL-CCNC: 48 U/L (ref 50–136)
ALT SERPL-CCNC: 21 U/L (ref 12–65)
ANION GAP SERPL CALC-SCNC: 7 MMOL/L (ref 2–11)
AST SERPL-CCNC: 12 U/L (ref 15–37)
BASOPHILS # BLD: 0 K/UL (ref 0–0.2)
BASOPHILS NFR BLD: 1 % (ref 0–2)
BILIRUB SERPL-MCNC: 0.3 MG/DL (ref 0.2–1.1)
BUN SERPL-MCNC: 11 MG/DL (ref 6–23)
CALCIUM SERPL-MCNC: 9.8 MG/DL (ref 8.3–10.4)
CHLORIDE SERPL-SCNC: 107 MMOL/L (ref 101–110)
CO2 SERPL-SCNC: 28 MMOL/L (ref 21–32)
CREAT SERPL-MCNC: 0.68 MG/DL (ref 0.6–1)
DIFFERENTIAL METHOD BLD: ABNORMAL
EKG ATRIAL RATE: 60 BPM
EKG DIAGNOSIS: NORMAL
EKG P AXIS: 40 DEGREES
EKG P-R INTERVAL: 128 MS
EKG Q-T INTERVAL: 434 MS
EKG QRS DURATION: 82 MS
EKG QTC CALCULATION (BAZETT): 434 MS
EKG R AXIS: 91 DEGREES
EKG T AXIS: 71 DEGREES
EKG VENTRICULAR RATE: 60 BPM
EOSINOPHIL # BLD: 0.1 K/UL (ref 0–0.8)
EOSINOPHIL NFR BLD: 2 % (ref 0.5–7.8)
ERYTHROCYTE [DISTWIDTH] IN BLOOD BY AUTOMATED COUNT: 13.4 % (ref 11.9–14.6)
GLOBULIN SER CALC-MCNC: 4.2 G/DL (ref 2.8–4.5)
GLUCOSE SERPL-MCNC: 99 MG/DL (ref 65–100)
HCT VFR BLD AUTO: 43.4 % (ref 35.8–46.3)
HGB BLD-MCNC: 14 G/DL (ref 11.7–15.4)
IMM GRANULOCYTES # BLD AUTO: 0 K/UL (ref 0–0.5)
IMM GRANULOCYTES NFR BLD AUTO: 0 % (ref 0–5)
LYMPHOCYTES # BLD: 1.3 K/UL (ref 0.5–4.6)
LYMPHOCYTES NFR BLD: 33 % (ref 13–44)
MCH RBC QN AUTO: 28.7 PG (ref 26.1–32.9)
MCHC RBC AUTO-ENTMCNC: 32.3 G/DL (ref 31.4–35)
MCV RBC AUTO: 88.9 FL (ref 82–102)
MONOCYTES # BLD: 0.6 K/UL (ref 0.1–1.3)
MONOCYTES NFR BLD: 14 % (ref 4–12)
NEUTS SEG # BLD: 2 K/UL (ref 1.7–8.2)
NEUTS SEG NFR BLD: 51 % (ref 43–78)
NRBC # BLD: 0 K/UL (ref 0–0.2)
PLATELET # BLD AUTO: 236 K/UL (ref 150–450)
PMV BLD AUTO: 11.3 FL (ref 9.4–12.3)
POTASSIUM SERPL-SCNC: 3.8 MMOL/L (ref 3.5–5.1)
PROT SERPL-MCNC: 8.2 G/DL (ref 6.3–8.2)
RBC # BLD AUTO: 4.88 M/UL (ref 4.05–5.2)
SODIUM SERPL-SCNC: 142 MMOL/L (ref 133–143)
WBC # BLD AUTO: 4 K/UL (ref 4.3–11.1)

## 2023-06-09 PROCEDURE — 93010 ELECTROCARDIOGRAM REPORT: CPT | Performed by: INTERNAL MEDICINE

## 2023-06-09 PROCEDURE — 71045 X-RAY EXAM CHEST 1 VIEW: CPT

## 2023-06-09 PROCEDURE — 80053 COMPREHEN METABOLIC PANEL: CPT

## 2023-06-09 PROCEDURE — 93005 ELECTROCARDIOGRAM TRACING: CPT | Performed by: EMERGENCY MEDICINE

## 2023-06-09 PROCEDURE — 2580000003 HC RX 258: Performed by: EMERGENCY MEDICINE

## 2023-06-09 PROCEDURE — 85025 COMPLETE CBC W/AUTO DIFF WBC: CPT

## 2023-06-09 RX ORDER — SODIUM CHLORIDE, SODIUM LACTATE, POTASSIUM CHLORIDE, AND CALCIUM CHLORIDE .6; .31; .03; .02 G/100ML; G/100ML; G/100ML; G/100ML
1000 INJECTION, SOLUTION INTRAVENOUS
Status: COMPLETED | OUTPATIENT
Start: 2023-06-09 | End: 2023-06-09

## 2023-06-09 RX ADMIN — SODIUM CHLORIDE, SODIUM LACTATE, POTASSIUM CHLORIDE, AND CALCIUM CHLORIDE 1000 ML: 600; 310; 30; 20 INJECTION, SOLUTION INTRAVENOUS at 12:30

## 2023-06-09 ASSESSMENT — PAIN - FUNCTIONAL ASSESSMENT: PAIN_FUNCTIONAL_ASSESSMENT: 0-10

## 2023-06-09 ASSESSMENT — PAIN SCALES - GENERAL: PAINLEVEL_OUTOF10: 0

## 2023-06-09 NOTE — ED TRIAGE NOTES
Ambulatory to triage. PTA pt stood up and everything went back and she hit the ground. Denies hitting head. Possible syncope although denies LOC (reports hearing everything around her) . Pt reports fatigue and dizziness prior to episode. Hx: POTS.

## 2023-06-09 NOTE — ED NOTES
I have reviewed discharge instructions with the patient. The patient verbalized understanding. Patient left ED via Discharge Method: ambulatory to Home with self. Opportunity for questions and clarification provided. Patient given 0 scripts. To continue your aftercare when you leave the hospital, you may receive an automated call from our care team to check in on how you are doing. This is a free service and part of our promise to provide the best care and service to meet your aftercare needs.  If you have questions, or wish to unsubscribe from this service please call [phone]. Thank you for Choosing our [facility] Emergency Department.         Jennifer Parker RN  06/09/23 9864

## 2023-06-09 NOTE — ED PROVIDER NOTES
History     Socioeconomic History    Marital status: Single   Tobacco Use    Smoking status: Never    Smokeless tobacco: Never   Substance and Sexual Activity    Alcohol use: Never   Social History Narrative    ** Merged History Encounter **             Previous Medications    CABERGOLINE (DOSTINEX) 0.5 MG TABLET    Take 1 tablet by mouth every 7 days    CALCIUM CARBONATE 600 MG TABS TABLET    Take 1 tablet by mouth daily    CETIRIZINE (ZYRTEC) 10 MG TABLET    TAKE 1 TABLET (10 MG) BY MOUTH DAILY. CHOLECALCIFEROL (VITAMIN D3) 50 MCG (2000 UT) CAPS    Take by mouth    IVABRADINE (CORLANOR) 5 MG TABS TABLET    Take 1 tablet by mouth 2 times daily (with meals)    METOCLOPRAMIDE (REGLAN) 5 MG TABLET    Take 1 tablet by mouth in the morning, at noon, and at bedtime    MULTIPLE VITAMIN (MULTI-VITAMIN DAILY PO)    Take by mouth    NORGESTIMATE-ETHINYL ESTRADIOL (ORTHO-CYCLEN) 0.25-35 MG-MCG PER TABLET    Take 1 tablet by mouth daily    SODIUM CHLORIDE 1 G TABLET    Take 1 tablet by mouth 3 times daily        Results for orders placed or performed during the hospital encounter of 06/09/23   XR CHEST PORTABLE    Narrative    PA CHEST ONE VIEW    HISTORY: Syncope    COMPARISON: 2/23/2023    FINDINGS: The heart size is normal.    There is no lobar consolidation, pleural effusions, or pulmonary edema. Impression    No active pulmonary findings.    CBC with Auto Differential   Result Value Ref Range    WBC 4.0 (L) 4.3 - 11.1 K/uL    RBC 4.88 4.05 - 5.2 M/uL    Hemoglobin 14.0 11.7 - 15.4 g/dL    Hematocrit 43.4 35.8 - 46.3 %    MCV 88.9 82.0 - 102.0 FL    MCH 28.7 26.1 - 32.9 PG    MCHC 32.3 31.4 - 35.0 g/dL    RDW 13.4 11.9 - 14.6 %    Platelets 324 884 - 118 K/uL    MPV 11.3 9.4 - 12.3 FL    nRBC 0.00 0.0 - 0.2 K/uL    Differential Type AUTOMATED      Neutrophils % 51 43 - 78 %    Lymphocytes % 33 13 - 44 %    Monocytes % 14 (H) 4.0 - 12.0 %    Eosinophils % 2 0.5 - 7.8 %    Basophils % 1 0.0 - 2.0 %    Immature
160

## 2023-09-07 ENCOUNTER — HOSPITAL ENCOUNTER (EMERGENCY)
Age: 38
Discharge: HOME | End: 2023-09-07
Attending: EMERGENCY MEDICINE
Payer: COMMERCIAL

## 2023-09-07 VITALS
SYSTOLIC BLOOD PRESSURE: 125 MMHG | BODY MASS INDEX: 23.78 KG/M2 | DIASTOLIC BLOOD PRESSURE: 77 MMHG | HEIGHT: 66 IN | WEIGHT: 148 LBS | OXYGEN SATURATION: 99 % | TEMPERATURE: 98.2 F | HEART RATE: 73 BPM | RESPIRATION RATE: 16 BRPM

## 2023-09-07 DIAGNOSIS — R42 LIGHTHEADEDNESS: Primary | ICD-10-CM

## 2023-09-07 DIAGNOSIS — G90.A POTS (POSTURAL ORTHOSTATIC TACHYCARDIA SYNDROME): ICD-10-CM

## 2023-09-07 DIAGNOSIS — R82.998 LEUKOCYTES IN URINE: ICD-10-CM

## 2023-09-07 LAB
ALBUMIN SERPL-MCNC: 3.7 G/DL (ref 3.5–5)
ALBUMIN/GLOB SERPL: 1.1 (ref 0.4–1.6)
ALP SERPL-CCNC: 54 U/L (ref 50–136)
ALT SERPL-CCNC: 22 U/L (ref 12–65)
ANION GAP SERPL CALC-SCNC: 0 MMOL/L (ref 2–11)
APPEARANCE UR: CLEAR
AST SERPL-CCNC: 11 U/L (ref 15–37)
BACTERIA URNS QL MICRO: NEGATIVE /HPF
BASOPHILS # BLD: 0 K/UL (ref 0–0.2)
BASOPHILS NFR BLD: 1 % (ref 0–2)
BILIRUB SERPL-MCNC: 0.2 MG/DL (ref 0.2–1.1)
BILIRUB UR QL: NEGATIVE
BUN SERPL-MCNC: 10 MG/DL (ref 6–23)
CALCIUM SERPL-MCNC: 9 MG/DL (ref 8.3–10.4)
CASTS URNS QL MICRO: ABNORMAL /LPF
CHLORIDE SERPL-SCNC: 109 MMOL/L (ref 101–110)
CO2 SERPL-SCNC: 31 MMOL/L (ref 21–32)
COLOR UR: ABNORMAL
CREAT SERPL-MCNC: 0.7 MG/DL (ref 0.6–1)
CRP SERPL-MCNC: 0.4 MG/DL (ref 0–0.9)
DIFFERENTIAL METHOD BLD: NORMAL
EOSINOPHIL # BLD: 0.2 K/UL (ref 0–0.8)
EOSINOPHIL NFR BLD: 5 % (ref 0.5–7.8)
EPI CELLS #/AREA URNS HPF: ABNORMAL /HPF
ERYTHROCYTE [DISTWIDTH] IN BLOOD BY AUTOMATED COUNT: 13.2 % (ref 11.9–14.6)
GLOBULIN SER CALC-MCNC: 3.5 G/DL (ref 2.8–4.5)
GLUCOSE SERPL-MCNC: 103 MG/DL (ref 65–100)
GLUCOSE UR STRIP.AUTO-MCNC: NEGATIVE MG/DL
HCG UR QL: NEGATIVE
HCT VFR BLD AUTO: 41.1 % (ref 35.8–46.3)
HGB BLD-MCNC: 13.5 G/DL (ref 11.7–15.4)
HGB UR QL STRIP: ABNORMAL
IMM GRANULOCYTES # BLD AUTO: 0 K/UL (ref 0–0.5)
IMM GRANULOCYTES NFR BLD AUTO: 0 % (ref 0–5)
KETONES UR QL STRIP.AUTO: NEGATIVE MG/DL
LEUKOCYTE ESTERASE UR QL STRIP.AUTO: ABNORMAL
LYMPHOCYTES # BLD: 1.7 K/UL (ref 0.5–4.6)
LYMPHOCYTES NFR BLD: 39 % (ref 13–44)
MCH RBC QN AUTO: 28.8 PG (ref 26.1–32.9)
MCHC RBC AUTO-ENTMCNC: 32.8 G/DL (ref 31.4–35)
MCV RBC AUTO: 87.8 FL (ref 82–102)
MONOCYTES # BLD: 0.5 K/UL (ref 0.1–1.3)
MONOCYTES NFR BLD: 10 % (ref 4–12)
NEUTS SEG # BLD: 1.9 K/UL (ref 1.7–8.2)
NEUTS SEG NFR BLD: 44 % (ref 43–78)
NITRITE UR QL STRIP.AUTO: NEGATIVE
NRBC # BLD: 0 K/UL (ref 0–0.2)
PH UR STRIP: 7 (ref 5–9)
PLATELET # BLD AUTO: 224 K/UL (ref 150–450)
PMV BLD AUTO: 11 FL (ref 9.4–12.3)
POTASSIUM SERPL-SCNC: 3.5 MMOL/L (ref 3.5–5.1)
PROT SERPL-MCNC: 7.2 G/DL (ref 6.3–8.2)
PROT UR STRIP-MCNC: NEGATIVE MG/DL
RBC # BLD AUTO: 4.68 M/UL (ref 4.05–5.2)
RBC #/AREA URNS HPF: ABNORMAL /HPF
SODIUM SERPL-SCNC: 140 MMOL/L (ref 133–143)
SP GR UR REFRACTOMETRY: 1 (ref 1–1.02)
UROBILINOGEN UR QL STRIP.AUTO: 0.2 EU/DL (ref 0.2–1)
WBC # BLD AUTO: 4.3 K/UL (ref 4.3–11.1)
WBC URNS QL MICRO: ABNORMAL /HPF

## 2023-09-07 PROCEDURE — 81001 URINALYSIS AUTO W/SCOPE: CPT

## 2023-09-07 PROCEDURE — 86140 C-REACTIVE PROTEIN: CPT

## 2023-09-07 PROCEDURE — 2580000003 HC RX 258: Performed by: PHYSICIAN ASSISTANT

## 2023-09-07 PROCEDURE — 80053 COMPREHEN METABOLIC PANEL: CPT

## 2023-09-07 PROCEDURE — 99284 EMERGENCY DEPT VISIT MOD MDM: CPT

## 2023-09-07 PROCEDURE — 85025 COMPLETE CBC W/AUTO DIFF WBC: CPT

## 2023-09-07 PROCEDURE — 87086 URINE CULTURE/COLONY COUNT: CPT

## 2023-09-07 PROCEDURE — 81025 URINE PREGNANCY TEST: CPT

## 2023-09-07 RX ORDER — CEPHALEXIN 500 MG/1
500 CAPSULE ORAL 4 TIMES DAILY
Qty: 28 CAPSULE | Refills: 0 | Status: SHIPPED | OUTPATIENT
Start: 2023-09-07 | End: 2023-09-14

## 2023-09-07 RX ORDER — OLANZAPINE 2.5 MG/1
2.5 TABLET ORAL
COMMUNITY
Start: 2023-08-29 | End: 2023-11-27

## 2023-09-07 RX ORDER — 0.9 % SODIUM CHLORIDE 0.9 %
1000 INTRAVENOUS SOLUTION INTRAVENOUS
Status: COMPLETED | OUTPATIENT
Start: 2023-09-07 | End: 2023-09-07

## 2023-09-07 RX ADMIN — SODIUM CHLORIDE 1000 ML: 9 INJECTION, SOLUTION INTRAVENOUS at 19:58

## 2023-09-07 ASSESSMENT — ENCOUNTER SYMPTOMS
BLOOD IN STOOL: 0
DIARRHEA: 0
VISUAL CHANGE: 0
EYES NEGATIVE: 1
VOMITING: 0
NAUSEA: 0
GASTROINTESTINAL NEGATIVE: 1
ALLERGIC/IMMUNOLOGIC NEGATIVE: 1
SHORTNESS OF BREATH: 0
RESPIRATORY NEGATIVE: 1

## 2023-09-07 ASSESSMENT — PAIN - FUNCTIONAL ASSESSMENT
PAIN_FUNCTIONAL_ASSESSMENT: NONE - DENIES PAIN
PAIN_FUNCTIONAL_ASSESSMENT: 0-10

## 2023-09-07 ASSESSMENT — LIFESTYLE VARIABLES
HOW OFTEN DO YOU HAVE A DRINK CONTAINING ALCOHOL: NEVER
HOW MANY STANDARD DRINKS CONTAINING ALCOHOL DO YOU HAVE ON A TYPICAL DAY: PATIENT DOES NOT DRINK

## 2023-09-07 ASSESSMENT — PAIN SCALES - GENERAL: PAINLEVEL_OUTOF10: 0

## 2023-09-07 NOTE — ED PROVIDER NOTES
Ref Range    WBC 4.3 4.3 - 11.1 K/uL    RBC 4.68 4.05 - 5.2 M/uL    Hemoglobin 13.5 11.7 - 15.4 g/dL    Hematocrit 41.1 35.8 - 46.3 %    MCV 87.8 82.0 - 102.0 FL    MCH 28.8 26.1 - 32.9 PG    MCHC 32.8 31.4 - 35.0 g/dL    RDW 13.2 11.9 - 14.6 %    Platelets 001 610 - 286 K/uL    MPV 11.0 9.4 - 12.3 FL    nRBC 0.00 0.0 - 0.2 K/uL    Differential Type AUTOMATED      Neutrophils % 44 43 - 78 %    Lymphocytes % 39 13 - 44 %    Monocytes % 10 4.0 - 12.0 %    Eosinophils % 5 0.5 - 7.8 %    Basophils % 1 0.0 - 2.0 %    Immature Granulocytes 0 0.0 - 5.0 %    Neutrophils Absolute 1.9 1.7 - 8.2 K/UL    Lymphocytes Absolute 1.7 0.5 - 4.6 K/UL    Monocytes Absolute 0.5 0.1 - 1.3 K/UL    Eosinophils Absolute 0.2 0.0 - 0.8 K/UL    Basophils Absolute 0.0 0.0 - 0.2 K/UL    Absolute Immature Granulocyte 0.0 0.0 - 0.5 K/UL   CMP   Result Value Ref Range    Sodium 140 133 - 143 mmol/L    Potassium 3.5 3.5 - 5.1 mmol/L    Chloride 109 101 - 110 mmol/L    CO2 31 21 - 32 mmol/L    Anion Gap 0 (L) 2 - 11 mmol/L    Glucose 103 (H) 65 - 100 mg/dL    BUN 10 6 - 23 MG/DL    Creatinine 0.70 0.6 - 1.0 MG/DL    Est, Glom Filt Rate >60 >60 ml/min/1.73m2    Calcium 9.0 8.3 - 10.4 MG/DL    Total Bilirubin 0.2 0.2 - 1.1 MG/DL    ALT 22 12 - 65 U/L    AST 11 (L) 15 - 37 U/L    Alk Phosphatase 54 50 - 136 U/L    Total Protein 7.2 6.3 - 8.2 g/dL    Albumin 3.7 3.5 - 5.0 g/dL    Globulin 3.5 2.8 - 4.5 g/dL    Albumin/Globulin Ratio 1.1 0.4 - 1.6     Urinalysis w rflx microscopic   Result Value Ref Range    Color, UA YELLOW/STRAW      Appearance CLEAR      Specific Gravity, UA 1.005 1.001 - 1.023      pH, Urine 7.0 5.0 - 9.0      Protein, UA Negative NEG mg/dL    Glucose, UA Negative mg/dL    Ketones, Urine Negative NEG mg/dL    Bilirubin Urine Negative NEG      Blood, Urine TRACE (A) NEG      Urobilinogen, Urine 0.2 0.2 - 1.0 EU/dL    Nitrite, Urine Negative NEG      Leukocyte Esterase, Urine SMALL (A) NEG      WBC, UA 5-10 (A) U4 /hpf    RBC, UA 0-5 U5 /hpf    Epithelial Cells UA 0-5 U5 /hpf    BACTERIA, URINE Negative NEG /hpf    Casts 0-2 U2 /lpf   Pregnancy, Urine   Result Value Ref Range    HCG(Urine) Pregnancy Test Negative NEG     C-Reactive Protein   Result Value Ref Range    CRP 0.4 0.0 - 0.9 mg/dL        No orders to display                     Voice dictation software was used during the making of this note. This software is not perfect and grammatical and other typographical errors may be present. This note has not been completely proofread for errors.      ALEXANDRA Mallory  09/07/23 0583

## 2023-09-07 NOTE — ED TRIAGE NOTES
Patient reports dizzy and off balance x 3 weeks. Patient reports symptoms are worse when changing positions.

## 2023-09-07 NOTE — ED NOTES
Report given to Rena Finn RN. Pt awake, alert, and oriented. No needs stated at this time.       Carolina Mackay RN  09/07/23 1919

## 2023-09-08 NOTE — DISCHARGE INSTRUCTIONS
Make sure you are drinking plenty of fluids, finish all of the Keflex, we will call you if the urine culture shows that we need to change the antibiotics. This could take 4 to 5 days. Please return to the ED if you are worsening in any way. Follow-up with your primary care physician for recheck.

## 2023-09-10 LAB
BACTERIA SPEC CULT: NORMAL
BACTERIA SPEC CULT: NORMAL
SERVICE CMNT-IMP: NORMAL

## 2023-11-27 ENCOUNTER — OFFICE VISIT (OUTPATIENT)
Age: 38
End: 2023-11-27
Payer: COMMERCIAL

## 2023-11-27 VITALS
SYSTOLIC BLOOD PRESSURE: 122 MMHG | DIASTOLIC BLOOD PRESSURE: 64 MMHG | WEIGHT: 148 LBS | BODY MASS INDEX: 23.78 KG/M2 | HEIGHT: 66 IN | HEART RATE: 80 BPM

## 2023-11-27 DIAGNOSIS — G90.A POTS (POSTURAL ORTHOSTATIC TACHYCARDIA SYNDROME): Primary | ICD-10-CM

## 2023-11-27 DIAGNOSIS — R00.2 PALPITATIONS: ICD-10-CM

## 2023-11-27 PROCEDURE — 99214 OFFICE O/P EST MOD 30 MIN: CPT | Performed by: INTERNAL MEDICINE

## 2023-11-27 RX ORDER — DICYCLOMINE HCL 20 MG
TABLET ORAL
COMMUNITY
Start: 2023-11-20

## 2023-11-27 ASSESSMENT — ENCOUNTER SYMPTOMS
EYE PAIN: 0
APHONIA: 0
STRIDOR: 0
NAIL CHANGES: 0
ABDOMINAL PAIN: 0
COUGH: 0

## 2023-11-27 NOTE — PROGRESS NOTES
[facility], [facility], [address]  PHONE: [phone]    SUBJECTIVE:   Magdaleno Gentile is a 45 y.o. female 1985      Chief Complaint   Patient presents with    6 Month Follow-Up    Palpitations      History of present illness: 45 y.o. female heart rate controlled with activity however having palpitations intermittently. Interval hx   history of tachycardia presented for consultation 4/9/2023. She was previously seen by her primary care physician Dr. Chris Farah with complaints of palpitations she underwent echocardiogram 9/2022 with normal left ventricular systolic function and no major valvular abnormalities. Additional documentation of SVT. G0, P0. She was originally diagnosed with POTS in 2018 in [place]. She has been on multiple beta-blockers in the past with ineffective control of her tachycardia. Events occur while rising from seated to standing position or when standing. Patient teaches . Symptoms worse over the past year    Cardiac History:  2018 Tilt table test POTS in [place]   Tx with atenolol propranolol  9/2022 echocardiogram Ashly ejection fraction greater than 65% no major valvular abnormalities described  4/10/2023 sinus rhythm, normal rate, normal CO intervals, ST wave normal, normal axis,   4/2023 initiated on Corlanor  Iron studies performed with no significant iron deficiency anemia  Assessment:  POTS    Palpitations intermittent plan for monitor at this time TSH will be checked      Wt Readings from Last 3 Encounters:   11/27/23 67.1 kg (148 lb)   09/07/23 67.1 kg (148 lb)   05/23/23 68 kg (150 lb)         Past Medical History, Past Surgical History, Family history, Social History, and Medications were all reviewed with the patient today and updated as necessary.      Past Medical History:  Past Medical History:   Diagnosis Date    Abnormal Pap smear of cervix 2007   followed up with pap smears per pt report    Anxiety 2010    Asthma

## 2023-12-20 ENCOUNTER — TELEPHONE (OUTPATIENT)
Age: 38
End: 2023-12-20

## 2023-12-20 NOTE — TELEPHONE ENCOUNTER
Yan Hays MD Hamilton, Carmen G, LPN  Caller: Unspecified (Today, 10:23 AM)  Nothing urgent    Noted.   Pt has UCD FU 5/29/24.  Dr. Razo do you want to see pt sooner?  Any recs?  Thanks, cgh

## 2023-12-20 NOTE — TELEPHONE ENCOUNTER
Zio Rep reports monitor wear from 11/27/23-12/4/23.  X3 episodes SVT, symptomatic  Avg  BPM x 30 sec  SVE triplets or aberrant beats  Ventricular Bigeminy, 47.7 sec longest episode.  cgh    Per 11/27/23/ OV:     RYLIE Lord was seen today for 6 month follow-up and palpitations.     Diagnoses and all orders for this visit:     POTS (postural orthostatic tachycardia syndrome)     Palpitations  -     Extended cardiac holter monitor (48 hrs - 15 days); Future  -     TSH with Reflex; Future      Return in about 6 months (around 5/27/2024).

## 2024-01-04 ENCOUNTER — OFFICE VISIT (OUTPATIENT)
Age: 39
End: 2024-01-04
Payer: COMMERCIAL

## 2024-01-04 VITALS
BODY MASS INDEX: 24.27 KG/M2 | DIASTOLIC BLOOD PRESSURE: 64 MMHG | WEIGHT: 151 LBS | SYSTOLIC BLOOD PRESSURE: 110 MMHG | HEIGHT: 66 IN | HEART RATE: 80 BPM

## 2024-01-04 DIAGNOSIS — G90.A POTS (POSTURAL ORTHOSTATIC TACHYCARDIA SYNDROME): ICD-10-CM

## 2024-01-04 DIAGNOSIS — R00.2 PALPITATIONS: ICD-10-CM

## 2024-01-04 DIAGNOSIS — G90.A POTS (POSTURAL ORTHOSTATIC TACHYCARDIA SYNDROME): Primary | ICD-10-CM

## 2024-01-04 LAB — TSH W FREE THYROID IF ABNORMAL: 1.78 UIU/ML (ref 0.36–3.74)

## 2024-01-04 PROCEDURE — 99214 OFFICE O/P EST MOD 30 MIN: CPT | Performed by: INTERNAL MEDICINE

## 2024-01-04 ASSESSMENT — ENCOUNTER SYMPTOMS
COUGH: 0
APHONIA: 0
ABDOMINAL PAIN: 0
EYE PAIN: 0
NAIL CHANGES: 0
STRIDOR: 0

## 2024-01-04 NOTE — RESULT ENCOUNTER NOTE
A personalized result message was sent to [facility] with results via [address].

## 2024-01-04 NOTE — PROGRESS NOTES
68.5 kg (151 lb)   11/27/23 67.1 kg (148 lb)   09/07/23 67.1 kg (148 lb)         Past Medical History, Past Surgical History, Family history, Social History, and Medications were all reviewed with the patient today and updated as necessary.     Past Medical History:  Past Medical History:   Diagnosis Date    Abnormal Pap smear of cervix 2007   followed up with pap smears per pt report    Anxiety 2010    Asthma    Depression    Female infertility present    Pachyonychia congenita    SVT (supraventricular tachycardia) (HCC)    Varicella 1991     Past Surgical History:   Procedure Laterality Date    DILATION AND CURETTAGE OF UTERUS   with polypectomy    HYSTEROSCOPY 2022    UPPER GASTROINTESTINAL ENDOSCOPY   age 20    WISDOM TOOTH EXTRACTION   age 16     Family History   Problem Relation Age of Onset    Alcohol abuse Maternal Uncle    Anxiety disorder Mother    Depression Mother    Asthma Mother    Stroke Mother    Depression Maternal Grandmother     Past medical/surgical history, medications, family and social history were reviewed and documented in medical record.    Allergies   Allergen Reactions    Apple Headache-Intolerance    Augmentin [Amoxicillin-Pot Clavulanate] Nausea and/or vomiting-Intolerance    Meperidine Rash     Social History     Tobacco Use    Smoking status: Never    Smokeless tobacco: Never   Substance Use Topics    Alcohol use: Never       ROS:    Review of Systems   Constitutional: Negative for fever.   HENT:  Negative for stridor.    Eyes:  Negative for pain.   Cardiovascular:  Negative for chest pain.   Respiratory:  Negative for cough.    Endocrine: Negative for cold intolerance.   Skin:  Negative for nail changes.   Musculoskeletal:  Negative for arthritis.   Gastrointestinal:  Negative for abdominal pain.   Genitourinary:  Negative for dysuria.   Neurological:  Negative for aphonia.   Psychiatric/Behavioral:  Negative for altered mental status.    Allergic/Immunologic: Negative for hives.

## 2024-01-11 ENCOUNTER — TELEPHONE (OUTPATIENT)
Age: 39
End: 2024-01-11

## 2024-01-11 NOTE — TELEPHONE ENCOUNTER
Patient called stating that Dr Razo made a referral to Dr Apodaca office. Patient states she as yet hasn't heard anything from them.    Please call and advise.

## 2024-01-30 ENCOUNTER — INITIAL CONSULT (OUTPATIENT)
Age: 39
End: 2024-01-30
Payer: COMMERCIAL

## 2024-01-30 VITALS
WEIGHT: 152 LBS | HEART RATE: 74 BPM | BODY MASS INDEX: 24.43 KG/M2 | HEIGHT: 66 IN | DIASTOLIC BLOOD PRESSURE: 72 MMHG | SYSTOLIC BLOOD PRESSURE: 120 MMHG

## 2024-01-30 DIAGNOSIS — G90.A POTS (POSTURAL ORTHOSTATIC TACHYCARDIA SYNDROME): ICD-10-CM

## 2024-01-30 DIAGNOSIS — R00.2 PALPITATIONS: Primary | ICD-10-CM

## 2024-01-30 PROCEDURE — 93000 ELECTROCARDIOGRAM COMPLETE: CPT | Performed by: INTERNAL MEDICINE

## 2024-01-30 PROCEDURE — 99244 OFF/OP CNSLTJ NEW/EST MOD 40: CPT | Performed by: INTERNAL MEDICINE

## 2024-01-30 RX ORDER — OLANZAPINE 5 MG/1
5 TABLET ORAL NIGHTLY
COMMUNITY
Start: 2024-01-24

## 2024-01-30 NOTE — PROGRESS NOTES
patient today and updated as necessary.     Current Outpatient Medications   Medication Sig Dispense Refill    OLANZapine (ZYPREXA) 5 MG tablet 1 tablet nightly      dilTIAZem (CARDIZEM) 30 MG tablet Take 1 tablet by mouth 3 times daily as needed (tachycardia) 120 tablet 3    dicyclomine (BENTYL) 20 MG tablet       norgestimate-ethinyl estradiol (ORTHO-CYCLEN) 0.25-35 MG-MCG per tablet Take 1 tablet by mouth daily      cetirizine (ZYRTEC) 10 MG tablet Take 1 tablet by mouth as needed      calcium carbonate 600 MG TABS tablet Take 1 tablet by mouth daily      Multiple Vitamin (MULTI-VITAMIN DAILY PO) Take by mouth      ivabradine (CORLANOR) 5 MG TABS tablet Take 1 tablet by mouth 2 times daily (with meals) 180 tablet 3    metoclopramide (REGLAN) 5 MG tablet Take 1 tablet by mouth in the morning, at noon, and at bedtime (Patient not taking: Reported on 9/7/2023)      sodium chloride 1 g tablet Take 1 tablet by mouth 3 times daily (Patient not taking: Reported on 11/27/2023) 270 tablet 2    cabergoline (DOSTINEX) 0.5 MG tablet Take 1 tablet by mouth every 7 days (Patient not taking: Reported on 11/27/2023)       No current facility-administered medications for this visit.     Allergies   Allergen Reactions    Albuterol      Pt states she prefers not to have it because of tachycardia     Demerol [Meperidine Hcl] Rash       Past Medical History:   Diagnosis Date    Eating disorder     POTS (postural orthostatic tachycardia syndrome)      No past surgical history on file.  No family history on file.  Social History     Tobacco Use    Smoking status: Never    Smokeless tobacco: Never   Substance Use Topics    Alcohol use: Never       ROS:  A comprehensive review of systems was performed with the pertinent positives and negatives as noted in the HPI in addition to:  Review of Systems      PHYSICAL EXAM:   /72   Pulse 74   Ht 1.676 m (5' 6\")   Wt 68.9 kg (152 lb)   BMI 24.53 kg/m²      Wt Readings from Last 3

## 2024-03-25 NOTE — TELEPHONE ENCOUNTER
Requested Prescriptions     Signed Prescriptions Disp Refills    ivabradine (CORLANOR) 5 MG TABS tablet 180 tablet 3     Sig: Take 1 tablet by mouth 2 times daily (with meals)     Authorizing Provider: MANUEL WOODALL     Ordering User: SHILOH ANN       Rx verified.

## 2024-07-15 ENCOUNTER — OFFICE VISIT (OUTPATIENT)
Age: 39
End: 2024-07-15
Payer: COMMERCIAL

## 2024-07-15 VITALS
HEIGHT: 66 IN | SYSTOLIC BLOOD PRESSURE: 108 MMHG | WEIGHT: 146 LBS | BODY MASS INDEX: 23.46 KG/M2 | DIASTOLIC BLOOD PRESSURE: 62 MMHG

## 2024-07-15 DIAGNOSIS — R00.2 PALPITATIONS: Primary | ICD-10-CM

## 2024-07-15 PROCEDURE — 99214 OFFICE O/P EST MOD 30 MIN: CPT | Performed by: INTERNAL MEDICINE

## 2024-07-15 PROCEDURE — 93000 ELECTROCARDIOGRAM COMPLETE: CPT | Performed by: INTERNAL MEDICINE

## 2024-07-15 RX ORDER — FLUOXETINE HYDROCHLORIDE 40 MG/1
40 CAPSULE ORAL EVERY MORNING
COMMUNITY
Start: 2024-07-02 | End: 2024-08-01

## 2024-07-15 ASSESSMENT — ENCOUNTER SYMPTOMS
GASTROINTESTINAL NEGATIVE: 1
RESPIRATORY NEGATIVE: 1
ALLERGIC/IMMUNOLOGIC NEGATIVE: 1
EYES NEGATIVE: 1

## 2024-07-15 NOTE — PROGRESS NOTES
is drinking significant fluids which haven't helped much. She does report IV fluids in the ED have helped. 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 Previously grew up in [place] and had workup performed there including a UDAY which was consistent with POTS.     She comes in for followup. Overall about the same. Hasn't been on corlanor due to cost. Coping some days worse than others. Denies syncope. Works as .     Cardiac PMH: (Old records have been reviewed and summarized below)  2018 Tilt table test POTS in [place]   Tx with atenolol propranolol  9/2022 echocardiogram Ashly ejection fraction greater than 65% no major valvular abnormalities described  4/10/2023 sinus rhythm, normal rate, normal DE intervals, ST wave normal, normal axis,   4/2023 initiated on Corlanor  Iron studies performed with no significant iron deficiency anemia     EKG:  (EKG has been independently visualized by me with interpretation below): NSR, normal axis, no ischemia. No evidence for pre-excitation, QTc prolongation or Brugada pattern.     Monitor: 12/2023  Patient had a min HR of 45 bpm and avg HR of 78 bpm.  Predominant underlying rhythm was Sinus Rhythm. Slight P wave morphology  changes were noted. 3 Supraventricular Tachycardia runs occurred, the run with the  fastest interval lasting 1 min 54 secs with a max rate of (avg 165 bpm);  the run with the fastest interval was also the longest. Some episodes of  Supraventricular Tachycardia may be possible Atrial Tachycardia with variable  block. True duration of Supraventricular Tachycardia difficult to ascertain due to  artifact. Supraventricular Tachycardia was detected within +/- 45 seconds of  symptomatic patient event(s). Isolated SVEs were rare (<1.0%), SVE Couplets were  rare (<1.0%), and SVE Triplets were rare (<1.0%). Isolated VEs were rare (<1.0%),  VE Couplets were rare (<1.0%), and no VE Triplets were present. Ventricular  Bigeminy was present.      ECHO:

## 2024-07-23 ENCOUNTER — OFFICE VISIT (OUTPATIENT)
Age: 39
End: 2024-07-23
Payer: COMMERCIAL

## 2024-07-23 VITALS
DIASTOLIC BLOOD PRESSURE: 68 MMHG | HEIGHT: 66 IN | SYSTOLIC BLOOD PRESSURE: 108 MMHG | WEIGHT: 145 LBS | HEART RATE: 100 BPM | BODY MASS INDEX: 23.3 KG/M2

## 2024-07-23 DIAGNOSIS — G90.A POTS (POSTURAL ORTHOSTATIC TACHYCARDIA SYNDROME): Primary | ICD-10-CM

## 2024-07-23 PROCEDURE — 99214 OFFICE O/P EST MOD 30 MIN: CPT | Performed by: INTERNAL MEDICINE

## 2024-07-23 ASSESSMENT — ENCOUNTER SYMPTOMS
STRIDOR: 0
COUGH: 0
EYE PAIN: 0
ABDOMINAL PAIN: 0
APHONIA: 0
NAIL CHANGES: 0

## 2024-07-23 NOTE — PROGRESS NOTES
[facility], [address] [place] [address]  PHONE: [phone]    SUBJECTIVE:   Risa Alford is a 39 y.o. female 1985      Chief Complaint   Patient presents with    POTS    Follow-up      History of present illness: 39 y.o. female follow-up 7/23/2024 previous history of POTS.  Due to profound tachycardia she saw electrophysiology to ensure she was having no arrhythmia.  Her monitoring results were consistent with sinus tachycardia.  She has done very well over the past several months she is on diltiazem therapy due to cost of Corlanor.  She received iron infusions and is felt better.  In addition to that she is hydrating aggressively staying indoors during the summer.  Additionally she is swimming and exercising participating in tilt training.  Overall her symptomatology has improved since her initial appointment.    Interval hx   history of tachycardia presented for consultation 4/9/2023.  She was previously seen by her primary care physician Dr. Kamryn Escobedo with complaints of palpitations she underwent echocardiogram 9/2022 with normal left ventricular systolic function and no major valvular abnormalities.  Additional documentation of SVT.  G0, P0.  She was originally diagnosed with POTS in 2018 in [place].  She has been on multiple beta-blockers in the past with ineffective control of her tachycardia.  Events occur while rising from seated to standing position or when standing.  Patient teaches .  Symptoms worse over the past year    Cardiac History:  2018 Tilt table test POTS in [place]   Tx with atenolol propranolol  9/2022 echocardiogram Ashly ejection fraction greater than 65% no major valvular abnormalities described  4/10/2023 sinus rhythm, normal rate, normal NC intervals, ST wave normal, normal axis,   4/2023 initiated on Corlanor  Iron studies performed with no significant iron deficiency anemia  Assessment:  POTS  Currently on calcium channel blockers due to cost of

## 2024-07-23 NOTE — PATIENT INSTRUCTIONS
Recommend 20-30 mmHg compression stockings (check with provider whether you need knee-high or thigh-high if uncertain)  Recommended brands include Juzo, can also try Medi, Jobst or Sigvaris  CEP athletic compression socks are very popular.    Compression stocking vendors:  Online: www.ameswalker.com, www.compressionguru.com, www.bandagesplus.GetSnippy, www.Decision Sciences, wwwJiankongbao, www.cepcompression.com    [place]:  [facility]  [address] [place] [address]  [phone]  [facility]  [address] [place] [address]  [phone]  [facility]  [address] [place] [address]  [phone]  [facility]  [address] [place] [address]  [phone] [facility]  [address] [place] [address]  [phone]  [place]:  [facility]  [address] [place] [address]  [phone]  [facility]  [address] [place] [address]  [phone]  [facility]  [address] [place] [address]  [phone]  [place]:  [facility]  [address] [facility] [place] [address]  [phone]   [facility]  [address] [facility] [place] [address]  [phone] [facility]  [address] [place] [address]  [phone]

## 2024-12-20 NOTE — ED TRIAGE NOTES
Nutrition Assessment   Reason for consult/assessment: Reassessment    Diagnosis, Labs, Medication, History: Reviewed    Pertinent nutrition history prior to admission: Pt reports decreased appetite x 2 weeks due to thrush.  Eating small amounts of soft foods (yogurt, pudding, ice cream).                                 Oral diet order: Regular  Nutrition supplement/snacks: Ensure Clear daily           Diet tolerance: Tolerating with good appetite/intakes recorded (50-75%)  Nutrition supplement / snack tolerance: Not applicable (patient refusing) (Pt does not like the Ensure)    Food allergies: None known    Anthropometrics Information  % Weight change: -2.7kg/4% within 2 months  Weight change significant: No  Reason for weight change: Decreased intake    Estimated Needs     Calculated energy needs: 7179-1528  kcal            Calculated protein needs:   g   Calculated Fluid Needs: 1ml/kcal           Treatment Plan/Interventions        Nutrition supplement therapy: Modify oral supplement to Magic Cup daily                                                                                                    Goals & Monitoring  Goal: Increase oral intake to >/equal 50% of meals and supplements  Intervention goal status: Some progress toward goal    Dietitian will monitor: Food, beverage, and nutrient intake          Nutrition Diagnosis/PES   Nutrition Diagnosis: Inadequate oral intake     Related to:  (decreased appetite with thrush)  As evidenced by: Weight loss over time, Documented/reported poor oral intake     Primary nutrition diagnosis status: Active nutrition diagnosis                 Pt coming to the ED sent by her PCP to receive fluids. Pt has medical hx of POTS and has been experiencing dizziness and vision changes with postural changes. Pt states she is also experiencing slight chest pain, rates it at a 1. Pt denies nausea/vomiting. Breathing even and unlabored, no active signs of distress upon triage.

## 2025-07-08 ENCOUNTER — OFFICE VISIT (OUTPATIENT)
Age: 40
End: 2025-07-08
Payer: COMMERCIAL

## 2025-07-08 VITALS
WEIGHT: 149 LBS | HEART RATE: 97 BPM | SYSTOLIC BLOOD PRESSURE: 134 MMHG | DIASTOLIC BLOOD PRESSURE: 80 MMHG | BODY MASS INDEX: 24.05 KG/M2

## 2025-07-08 DIAGNOSIS — G90.A POTS (POSTURAL ORTHOSTATIC TACHYCARDIA SYNDROME): ICD-10-CM

## 2025-07-08 DIAGNOSIS — I47.10 SVT (SUPRAVENTRICULAR TACHYCARDIA): ICD-10-CM

## 2025-07-08 DIAGNOSIS — R00.2 PALPITATIONS: Primary | ICD-10-CM

## 2025-07-08 PROCEDURE — 93000 ELECTROCARDIOGRAM COMPLETE: CPT | Performed by: INTERNAL MEDICINE

## 2025-07-08 PROCEDURE — 99214 OFFICE O/P EST MOD 30 MIN: CPT | Performed by: INTERNAL MEDICINE

## 2025-07-08 RX ORDER — NORETHINDRONE ACETATE AND ETHINYL ESTRADIOL .03; 1.5 MG/1; MG/1
1 TABLET ORAL DAILY
COMMUNITY
Start: 2025-06-26

## 2025-07-08 NOTE — PROGRESS NOTES
[facility], [address], [address]  [place] [address]  PHONE: [phone]    SUBJECTIVE:   Risa Alford is a 40 y.o. female 1985   seen for a follow up visit regarding the following:     Chief Complaint   Patient presents with    Follow-up    Palpitations         History of Present Illness  The patient is a 40-year-old individual with a history of Postural Orthostatic Tachycardia Syndrome (POTS) and Supraventricular Tachycardia (SVT). The patient's heart rate remains stable, and there has been a reduction in the frequency of presyncope episodes. The patient utilizes compression stockings, although they report discomfort during the summer months. They engage in swimming activities on weekends. Hydration is maintained with the consumption of three 40-ounce bottles of water and 40 ounces of Gatorade daily. The patient administers diltiazem 2-3 times per month as needed. Current medications include cetirizine (Zyrtec), fluoxetine, and norethindrone. Ivabradine (Corlanor) was discontinued due to financial constraints and lack of therapeutic benefit.    SOCIAL HISTORY  -   - Swims on weekends        history of tachycardia presented for consultation 4/9/2023. She was previously seen by her primary care physician Dr. Kamryn Escobedo with complaints of palpitations she underwent echocardiogram 9/2022 with normal left ventricular systolic function and no major valvular abnormalities. Additional documentation of SVT. G0, P0. She was originally diagnosed with POTS in 2018 in [place]. She has been on multiple beta-blockers in the past with ineffective control of her tachycardia. Events occur while rising from seated to standing position or when standing. Patient teaches . Symptoms worse over the past year        Results    - 2018 Tilt table test POTS in [place]     - Tx with atenolol propranolol    - 9/2022 echocardiogram Ashly ejection fraction greater than